# Patient Record
(demographics unavailable — no encounter records)

---

## 2021-01-01 NOTE — NEWBORN INFANT H&P-ADMISSION
[place] Infant Record


Exam Date & Time


Date seen by provider:  2021


Time seen by provider:  06:20





Provider


PCP


Dr Bingham





Delivery Assessment


Expected Date of Delivery:  2021


Hx :  2


Hx Para:  2


Gestational Age in Weeks:  39


Gestational Age in Days:  4


Delivery Date:  2021


Delivery Time:  1815


Condition of Infant:  Living


Infant Delivery Method:  Spontaneous Vaginal


Operative Indications (Cesarea:  N/A-Vaginal Delivery


Anesthesia Type:  Epidural


Prenatal Events:  Routine Prenatal care


Intrapartal Events:  None


Gender:  Male


Viability:  Living





Mother's Group Strep


Mother's Group B Strep:  Negative


Mother's Group B Strep Comment:  


rubella immune





Apgar Score


Apgar Score at 1 Minute:  8


Apgar Score at 5 Minutes:  9





Condition/Feeding


Benefits of breastfeeding discussed with mother.


[place] Feeding Method:  Breast Milk-Exclusive


Gestation:  Single





Admission Examination


Activity/State:  Active Alert


Skin:  Vernix


Head Circumference:  13.75


Fontanelles:  Soft


Anterior [place] Descriptio:  WNL


Cephalohematoma:  No


Sclera Description:  Clear


Ears:  Normal


Mouth, Nose, Eyes:  Hard & Soft Palate Intact


Neck:  Head Mobile, Clavicles Intact


Chest Circumference:  14.25


Cardiovascular:  Regular Rhythm


Respiratory:  Regular


Breath Sounds:  Clear


Caput Succedaneum:  No


Abdomen:  Soft


Abdomen Circumference:  12.75


Genitalia:  Appear Normal, Testicles Descended


Back:  Spine Closed


Hips:  WNL


Movement:  Symmetric-Body, Full ROM


Muscle Tone:  Active





Weight/Height


Height (Inches):  20.00


Height (Calculated Centimeters:  50.299080


Weight (Pounds):  7


Weight (Ounces):  5.0


Weight (Calculated Kilograms):  3.109627


Weight (Calculated Grams):  3316.894





Vital Signs





Vital Signs








  Date Time  Temp Pulse Resp B/P (MAP) Pulse Ox O2 Delivery O2 Flow Rate FiO2


 


21 21:33 36.8 144 50     


 


21 19:00 36.8 150 60     











Impression on Admission


Impression on Admission:  Birth (), Infant (male), Living, Term (39w4d)





Progress/Plan/Problem List


Progress/Plan


1.  Admit to level 1 nursery


-routine  care orders


-circ in the am of 











GYPSY REED MD              2021 06:40

## 2021-01-01 NOTE — DISCHARGE INST-NURSERY
Discharge Inst-Nursery


Reconcile Patient Problems


Problems Reviewed?:  Yes





Instructions/Follow Up


Patient Instructions/Follow Up:  


Dr. Bingham in one week.





Activity


Avoid ALL Tobacco Products:  Second Hand Smoke





Diet


Pediatric Feeding Method:  Breast





Symptoms Report to Physician


[facility]:  


poor feeding or poor urine output.  Fever greater than 100.5


Parent Questions Call:  Nurse @ [phone]


For Problems/Questions:  Contact Your Physician





Skin/Wound Care


Circumcision:  Yes


Plastibell Used:  Keep Clean, NO Vaseline











GYPSY REED MD              Feb 12, 2021 12:50

## 2021-01-01 NOTE — NB CIRCUMCISION PROCEDURE NOTE
Circumcision Procedure Note


Preoperative Diagnosis


Pre-op Diagnosis


Redundant foreskin


Date of Service:  Feb 12, 2021





Risk/Time Out


Risk/Time Out


Risks, benefits, indications and contraindications of circumcision were 

discussed with parents (s) or legal guardian and they desire to proceed.





Time out was performed, verifying that written informed consent for circumcision

is on the chart, the patient is the one specified on the consent, and that he 

possesses the required anatomy for circumcision.





The infant was secured on an infant board for his protection.





The penis was inspected and pertinent anatomy was found to be normal.


Oral sucrose provided:  Yes





Local Anesthetic


Penis was cleansed with:  Alcohol, Betadine





Procedure


Procedure Note:


Hemostats were attached to the foreskin for traction.  Adhesions were bluntly 

lysed.  After lifting the foreskin away from the glans, a straight hemostat was 

aligned parallel to the penile shaft and clamped at the 12 o'clock position 

creating a hemostatic area to the dorsal prepuce. A dorsal slit was then created

by sharp dissection through the crushed tissue.  The foreskin was degloved off 

the glans and remaining adhesions were lysed with traction.  The urethral meatus

was inspected and found to have normal anatomy.





Circumcision Technique


Technique


plastibell


[place] Size:  1.2





Post Procedure


Post Procedure Note:


Baby tolerated the procedure well without complications.





The betadine was washed off the baby's skin.  He was diapered and returned to 

his parent(s)/caregiver(s).





They were given verbal and written instructions on proper care of the 

circumcised penis.


Dressing:  Open to Air





Estimated Blood Loss


Bleeding:  Minimal


Less than 1 mL:  Yes


Estimated blood loss in mL:  0.2


Post-op Diagnosis/Impression


Normal circumcised penis.











GYPSY REED MD              Feb 12, 2021 06:40

## 2021-01-01 NOTE — NEWBORN INFANT-DISCHARGE
[facility] Infant Discharge


Subjective/Events-Last Exam


breastfeeding has gone fairly well overall.


Date Patient Was Seen:  2021





Condition/Feeding


 Feeding Method:  Breast Milk-Exclusive





Discharge Examination


Activity/State:  Active Alert


Head Circumference:  13.75


Fontanelles:  Soft


Anterior [place] Descriptio:  WNL


Cephalohematoma:  No


Sclera Description:  Clear


Ears:  Normal


Mouth, Nose, Eyes:  Hard & Soft Palate Intact


Neck:  Head Mobile, Clavicles Intact


Chest Circumference:  14.25


Cardiovascular:  Regular Rhythm


Respiratory:  Regular


Breath Sounds:  Clear


Caput Succedaneum:  No


Abdomen:  Soft


Abdomen Circumference:  12.75


Genitalia:  Appear Normal, Testicles Descended


Genitalia Comments:  


plastibell in place


Back:  Spine Closed


Hips:  WNL


Movement:  Symmetric-Body, Full ROM


Muscle Tone:  Active





Weight/Height


Height (Inches):  20.00


Height (Calculated Centimeters:  50.653569


Weight (Pounds):  7


Weight (Ounces):  5.0


Weight (Calculated Kilograms):  3.975808


Weight (Calculated Grams):  3316.894





Vital Signs/Labs/SS


Vital Signs





Vital Signs








  Date Time  Temp Pulse Resp B/P (MAP) Pulse Ox O2 Delivery O2 Flow Rate FiO2


 


21 21:33 36.8 144 50     


 


21 19:00 36.8 150 60     











Discharge Diagnosis/Plan


Hep B Vaccine Given?:  Yes


PKU/Bili Done?:  Yes


Discharge Diagnosis/Impression:  Birth (), Infant (male), Living, Term 

(39w4d)


Plan


1.  DC to home with mother and father


-fu with Dr Bingham in 1 week


-infant to continue with BF











GYPSY REED MD              2021 12:49